# Patient Record
Sex: FEMALE | Race: WHITE
[De-identification: names, ages, dates, MRNs, and addresses within clinical notes are randomized per-mention and may not be internally consistent; named-entity substitution may affect disease eponyms.]

---

## 2017-01-23 DIAGNOSIS — E03.9 ACQUIRED HYPOTHYROIDISM: Primary | ICD-10-CM

## 2017-02-07 DIAGNOSIS — F43.22 ADJUSTMENT DISORDER WITH ANXIOUS MOOD: Primary | ICD-10-CM

## 2017-02-07 RX ORDER — BUSPIRONE HYDROCHLORIDE 5 MG/1
TABLET ORAL
Qty: 120 TABLET | Refills: 3 | Status: CANCELLED | OUTPATIENT
Start: 2017-02-07

## 2017-02-07 NOTE — TELEPHONE ENCOUNTER
Last two MyCharts were not read by pt. She is due for clinic appt now    I did speak to pt and she has moved to [facility]. She has not found a doctor there yet and she will be doing that soon.     She was just told by pharmacy that they have refill for her but she thought it was her thyroid medication, no buspirone. She is going to check again with pharmacy what they have for her.     I told pt she needs to get established with provider asap in [place]. She is overdue by 3 months for a TSH recheck.     . .     Carol Mercedes RN

## 2017-02-07 NOTE — TELEPHONE ENCOUNTER
BUSPIRONE 5 MG       Last Written Prescription Date: 12-23-16  Last Fill Quantity: 120; # refills: 3  Last Office Visit with [facility], [facility] or [facility] prescribing provider:  4-20-16        Last PHQ-9 score on record=   PHQ-9 SCORE 3/2/2016   Total Score -   Total Score 4       AST       30   11/19/2015  ALT       46   11/19/2015

## 2017-02-07 NOTE — TELEPHONE ENCOUNTER
Patient is requesting a 90 day supply.      BUSPIRONE 5 MG       Last Written Prescription Date: 12-23-16  Last Fill Quantity: 120; # refills: 3  Last Office Visit with [facility], [facility] or [facility] prescribing provider:  4-20-16        Last PHQ-9 score on record=   PHQ-9 SCORE 3/2/2016   Total Score -   Total Score 4       AST       30   11/19/2015  ALT       46   11/19/2015

## 2017-02-15 DIAGNOSIS — E03.9 ACQUIRED HYPOTHYROIDISM: ICD-10-CM

## 2017-02-16 RX ORDER — LEVOTHYROXINE SODIUM 50 UG/1
50 CAPSULE ORAL DAILY
Qty: 90 CAPSULE | Refills: 0 | Status: SHIPPED | OUTPATIENT
Start: 2017-02-16

## 2017-02-16 NOTE — TELEPHONE ENCOUNTER
Medication is being filled for 1 time refill only due to:  will be due in april for yearly labs and med check.    Stephanie Rosa RN

## 2017-02-16 NOTE — TELEPHONE ENCOUNTER
LEVOTHYROXINE 50 MCG TABLET          Last Written Prescription Date: 4/20/2016  Last Quantity: 90, # refills: 3  Last Office Visit with [facility] or [facility] prescribing provider: 4/20/2016        TSH   Date Value Ref Range Status   11/19/2015 5.16 (H) 0.40 - 4.00 mU/L Final

## 2017-10-10 DIAGNOSIS — F43.22 ADJUSTMENT DISORDER WITH ANXIOUS MOOD: ICD-10-CM

## 2017-10-10 NOTE — TELEPHONE ENCOUNTER
Medication Detail      Disp Refills Start End SANJU   busPIRone (BUSPAR) 5 MG tablet 120 tablet 3 12/23/2016  No   Sig: Take 1-2 tabs by mouth in the morning.  Take 2 tabs by mouth in the evening.       Last Office Visit with [facility], [facility] or [facility] prescribing provider:  4-20-16        Last PHQ-9 score on record=   PHQ-9 SCORE 3/2/2016   Total Score -   Total Score 4       Lab Results   Component Value Date    AST 30 11/19/2015     Lab Results   Component Value Date    ALT 46 11/19/2015

## 2017-10-11 NOTE — TELEPHONE ENCOUNTER
,    Pt has not been seen in clinic since April 2016. Please call her to make appt .     Thank you,  Carol Mercedes RN

## 2017-10-12 RX ORDER — BUSPIRONE HYDROCHLORIDE 5 MG/1
TABLET ORAL
Qty: 120 TABLET | Refills: 0 | Status: SHIPPED | OUTPATIENT
Start: 2017-10-12

## 2017-10-12 NOTE — TELEPHONE ENCOUNTER
Pt moved to [place] and is req a one time refill until she can est care down there, she just had cataract surgery and it's tough for to get out and about.  Lorri BURLESON  [place]

## 2017-11-09 DIAGNOSIS — F43.22 ADJUSTMENT DISORDER WITH ANXIOUS MOOD: ICD-10-CM

## 2017-11-13 RX ORDER — BUSPIRONE HYDROCHLORIDE 5 MG/1
TABLET ORAL
Qty: 120 TABLET | Refills: 0 | OUTPATIENT
Start: 2017-11-13

## 2017-11-13 NOTE — TELEPHONE ENCOUNTER
This patient moved to [place] and in October it was suppose to be her last refill. Informed through pharmacy to have her call clinic and let us know what is going on.  Stephanie Rosa RN

## 2019-11-07 ENCOUNTER — HEALTH MAINTENANCE LETTER (OUTPATIENT)
Age: 74
End: 2019-11-07

## 2020-02-23 ENCOUNTER — HEALTH MAINTENANCE LETTER (OUTPATIENT)
Age: 75
End: 2020-02-23

## 2020-11-29 ENCOUNTER — HEALTH MAINTENANCE LETTER (OUTPATIENT)
Age: 75
End: 2020-11-29

## 2021-04-10 ENCOUNTER — HEALTH MAINTENANCE LETTER (OUTPATIENT)
Age: 76
End: 2021-04-10

## 2021-09-25 ENCOUNTER — HEALTH MAINTENANCE LETTER (OUTPATIENT)
Age: 76
End: 2021-09-25

## 2021-11-20 ENCOUNTER — HEALTH MAINTENANCE LETTER (OUTPATIENT)
Age: 76
End: 2021-11-20

## 2022-05-07 ENCOUNTER — HEALTH MAINTENANCE LETTER (OUTPATIENT)
Age: 77
End: 2022-05-07

## 2022-12-26 ENCOUNTER — HEALTH MAINTENANCE LETTER (OUTPATIENT)
Age: 77
End: 2022-12-26

## 2023-06-02 ENCOUNTER — HEALTH MAINTENANCE LETTER (OUTPATIENT)
Age: 78
End: 2023-06-02